# Patient Record
Sex: MALE | Race: WHITE | Employment: OTHER | URBAN - NONMETROPOLITAN AREA
[De-identification: names, ages, dates, MRNs, and addresses within clinical notes are randomized per-mention and may not be internally consistent; named-entity substitution may affect disease eponyms.]

---

## 2017-06-27 ENCOUNTER — OFFICE VISIT (OUTPATIENT)
Dept: PRIMARY CARE CLINIC | Age: 67
End: 2017-06-27
Payer: MEDICARE

## 2017-06-27 VITALS
BODY MASS INDEX: 30.88 KG/M2 | SYSTOLIC BLOOD PRESSURE: 136 MMHG | HEIGHT: 72 IN | WEIGHT: 228 LBS | TEMPERATURE: 98.6 F | OXYGEN SATURATION: 93 % | DIASTOLIC BLOOD PRESSURE: 80 MMHG | HEART RATE: 70 BPM

## 2017-06-27 DIAGNOSIS — J02.9 PHARYNGITIS, UNSPECIFIED ETIOLOGY: Primary | ICD-10-CM

## 2017-06-27 LAB — S PYO AG THROAT QL: POSITIVE

## 2017-06-27 PROCEDURE — 1123F ACP DISCUSS/DSCN MKR DOCD: CPT | Performed by: PEDIATRICS

## 2017-06-27 PROCEDURE — 1036F TOBACCO NON-USER: CPT | Performed by: PEDIATRICS

## 2017-06-27 PROCEDURE — 99203 OFFICE O/P NEW LOW 30 MIN: CPT | Performed by: PEDIATRICS

## 2017-06-27 PROCEDURE — 3017F COLORECTAL CA SCREEN DOC REV: CPT | Performed by: PEDIATRICS

## 2017-06-27 PROCEDURE — 87880 STREP A ASSAY W/OPTIC: CPT | Performed by: PEDIATRICS

## 2017-06-27 PROCEDURE — 4040F PNEUMOC VAC/ADMIN/RCVD: CPT | Performed by: PEDIATRICS

## 2017-06-27 PROCEDURE — G8427 DOCREV CUR MEDS BY ELIG CLIN: HCPCS | Performed by: PEDIATRICS

## 2017-06-27 PROCEDURE — G8417 CALC BMI ABV UP PARAM F/U: HCPCS | Performed by: PEDIATRICS

## 2017-06-27 RX ORDER — ROPINIROLE 0.5 MG/1
0.5 TABLET, FILM COATED ORAL 2 TIMES DAILY
COMMUNITY

## 2017-06-27 RX ORDER — AMOXICILLIN 500 MG/1
500 CAPSULE ORAL 2 TIMES DAILY
Qty: 20 CAPSULE | Refills: 0 | Status: SHIPPED | OUTPATIENT
Start: 2017-06-27 | End: 2017-07-07

## 2017-06-27 RX ORDER — ESCITALOPRAM OXALATE 20 MG/1
20 TABLET ORAL DAILY
COMMUNITY

## 2017-06-27 RX ORDER — IRBESARTAN AND HYDROCHLOROTHIAZIDE 150; 12.5 MG/1; MG/1
1 TABLET, FILM COATED ORAL DAILY
COMMUNITY

## 2017-06-27 RX ORDER — ROSUVASTATIN CALCIUM 10 MG/1
10 TABLET, COATED ORAL DAILY
COMMUNITY

## 2017-06-27 RX ORDER — PANTOPRAZOLE SODIUM 40 MG/1
40 TABLET, DELAYED RELEASE ORAL DAILY
COMMUNITY

## 2017-06-27 ASSESSMENT — ENCOUNTER SYMPTOMS
COUGH: 0
CHOKING: 0
SINUS PRESSURE: 0
VOICE CHANGE: 0
WHEEZING: 0
TROUBLE SWALLOWING: 0
CHEST TIGHTNESS: 0
NAUSEA: 0
SORE THROAT: 1
SHORTNESS OF BREATH: 0
VOMITING: 0
CONSTIPATION: 0
ABDOMINAL PAIN: 0
BACK PAIN: 0
DIARRHEA: 0
ABDOMINAL DISTENTION: 0

## 2018-01-03 NOTE — PATIENT DISCUSSION
Patient was allergic to maxitrol. Stop current drops. Tobradex QID for 1 week then BID for 1 week OU.

## 2018-01-24 ENCOUNTER — IMPORTED ENCOUNTER (OUTPATIENT)
Dept: URBAN - METROPOLITAN AREA CLINIC 43 | Facility: CLINIC | Age: 68
End: 2018-01-24

## 2018-01-24 PROBLEM — Z96.1: Noted: 2018-01-24

## 2018-01-24 PROBLEM — E11.3293: Noted: 2018-01-24

## 2018-01-24 PROBLEM — H26.493: Noted: 2018-01-24

## 2018-02-20 ENCOUNTER — IMPORTED ENCOUNTER (OUTPATIENT)
Dept: URBAN - METROPOLITAN AREA CLINIC 43 | Facility: CLINIC | Age: 68
End: 2018-02-20

## 2018-02-20 PROBLEM — H26.491: Noted: 2018-02-20

## 2018-02-20 PROBLEM — H26.492: Noted: 2018-02-20

## 2018-03-13 ENCOUNTER — IMPORTED ENCOUNTER (OUTPATIENT)
Dept: URBAN - METROPOLITAN AREA CLINIC 43 | Facility: CLINIC | Age: 68
End: 2018-03-13

## 2018-03-13 PROBLEM — H26.491: Noted: 2018-03-13

## 2018-03-19 NOTE — PATIENT DISCUSSION
Patient states that the floaters are still bad and bothersome at times. recommended vitrectomy ou if and when they affect AVL.

## 2018-03-23 NOTE — PATIENT DISCUSSION
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

## 2019-03-25 ENCOUNTER — IMPORTED ENCOUNTER (OUTPATIENT)
Dept: URBAN - METROPOLITAN AREA CLINIC 43 | Facility: CLINIC | Age: 69
End: 2019-03-25

## 2019-03-25 ENCOUNTER — PREPPED CHART (OUTPATIENT)
Dept: URBAN - METROPOLITAN AREA CLINIC 32 | Facility: CLINIC | Age: 69
End: 2019-03-25

## 2019-03-25 PROBLEM — H43.813: Noted: 2019-03-25

## 2019-03-25 PROBLEM — Z96.1: Noted: 2019-03-25

## 2019-03-25 PROBLEM — E11.9: Noted: 2019-03-25

## 2019-03-25 PROBLEM — H16.223: Noted: 2019-03-25

## 2020-02-04 ENCOUNTER — ESTABLISHED COMPREHENSIVE EXAM (OUTPATIENT)
Dept: URBAN - METROPOLITAN AREA CLINIC 32 | Facility: CLINIC | Age: 70
End: 2020-02-04

## 2020-02-04 DIAGNOSIS — H16.223: ICD-10-CM

## 2020-02-04 PROCEDURE — 92015 DETERMINE REFRACTIVE STATE: CPT

## 2020-02-04 PROCEDURE — 92014 COMPRE OPH EXAM EST PT 1/>: CPT

## 2020-02-04 ASSESSMENT — VISUAL ACUITY
OS_CC: J3
OS_SC: 20/25+1
OD_SC: 20/50+1
OD_PH: 20/20-1
OD_CC: J2

## 2020-02-04 ASSESSMENT — TONOMETRY
OS_IOP_MMHG: 12
OD_IOP_MMHG: 14

## 2020-04-19 ASSESSMENT — TONOMETRY
OD_IOP_MMHG: 13.0
OD_IOP_MMHG: 16.0
OS_IOP_MMHG: 16.0
OS_IOP_MMHG: 14.0
OS_IOP_MMHG: 15.0
OD_IOP_MMHG: 15.0
OS_IOP_MMHG: 15.0
OD_IOP_MMHG: 14.0

## 2020-04-19 ASSESSMENT — KERATOMETRY
OS_K1POWER_DIOPTERS: 46
OD_K2POWER_DIOPTERS: 44.25
OD_AXISANGLE2_DEGREES: 175
OD_K1POWER_DIOPTERS: 44.75
OD_K1POWER_DIOPTERS: 45.5
OS_AXISANGLE_DEGREES: 10
OD_AXISANGLE_DEGREES: 85
OD_AXISANGLE2_DEGREES: 5
OD_AXISANGLE_DEGREES: 95
OD_K1POWER_DIOPTERS: 45.25
OS_K1POWER_DIOPTERS: 45.5
OD_AXISANGLE_DEGREES: 95
OD_K2POWER_DIOPTERS: 45
OS_AXISANGLE2_DEGREES: 100
OS_AXISANGLE2_DEGREES: 100
OS_K2POWER_DIOPTERS: 45
OS_K1POWER_DIOPTERS: 46
OS_K2POWER_DIOPTERS: 45
OS_AXISANGLE_DEGREES: 60
OS_AXISANGLE_DEGREES: 10
OD_K2POWER_DIOPTERS: 44
OD_AXISANGLE2_DEGREES: 5
OS_AXISANGLE2_DEGREES: 150
OS_K2POWER_DIOPTERS: 45

## 2020-04-19 ASSESSMENT — VISUAL ACUITY
OS_OTHER: <20/400.
OD_CC: J1+ @15"
OD_SC: 20/20-2
OS_SC: 20/40
OS_CC: J10
OS_CC: J1+
OD_SC: 20/40-
OS_SC: 20/20 -2
OS_CC: J1+ @15"
OD_OTHER: 20/400.
OD_PH: 20/25
OD_CC: J1+
OS_SC: 20/400
OD_SC: 20/40
OD_SC: 20/25 -2
OS_SC: 20/20-
OD_OTHER: 20/400.
OD_SC: 20/20
OS_SC: 20/25
OS_PH: 20/30+
OS_SC: 20/20 -2
OD_CC: J1+
OD_SC: 20/20-

## 2020-06-12 ENCOUNTER — OFFICE VISIT (OUTPATIENT)
Dept: URBAN - METROPOLITAN AREA CLINIC 68 | Facility: CLINIC | Age: 70
End: 2020-06-12

## 2020-06-12 ENCOUNTER — TELEPHONE ENCOUNTER (OUTPATIENT)
Dept: URBAN - METROPOLITAN AREA CLINIC 68 | Facility: CLINIC | Age: 70
End: 2020-06-12

## 2020-07-13 ENCOUNTER — LAB OUTSIDE AN ENCOUNTER (OUTPATIENT)
Dept: URBAN - METROPOLITAN AREA CLINIC 68 | Facility: CLINIC | Age: 70
End: 2020-07-13

## 2020-07-24 ENCOUNTER — TELEPHONE ENCOUNTER (OUTPATIENT)
Dept: URBAN - METROPOLITAN AREA CLINIC 68 | Facility: CLINIC | Age: 70
End: 2020-07-24

## 2022-06-04 ENCOUNTER — TELEPHONE ENCOUNTER (OUTPATIENT)
Dept: URBAN - METROPOLITAN AREA CLINIC 68 | Facility: CLINIC | Age: 72
End: 2022-06-04

## 2022-06-04 RX ORDER — RIFAXIMIN 200 MG/1
TABLET ORAL
Qty: 42 | Refills: 0 | OUTPATIENT
Start: 2020-02-24 | End: 2020-03-09

## 2022-06-04 RX ORDER — LOSARTAN POTASSIUM AND HYDROCHLOROTHIAZIDE 100; 25 MG/1; MG/1
LOSARTAN POTASSIUM-HCTZ( 100-25MG ORAL  DAILY ) INACTIVE -HX ENTRY TABLET, FILM COATED ORAL DAILY
OUTPATIENT
Start: 2020-02-24

## 2022-06-04 RX ORDER — SUCRALFATE 1 G/1
TABLET ORAL
Qty: 60 | Refills: 0 | OUTPATIENT
Start: 2020-05-28 | End: 2020-06-27

## 2022-06-05 ENCOUNTER — TELEPHONE ENCOUNTER (OUTPATIENT)
Dept: URBAN - METROPOLITAN AREA CLINIC 68 | Facility: CLINIC | Age: 72
End: 2022-06-05

## 2022-06-05 RX ORDER — LOSARTAN POTASSIUM 25 MG/1
LOSARTAN POTASSIUM( 25MG ORAL  DAILY ) ACTIVE -HX ENTRY TABLET ORAL DAILY
Status: ACTIVE | COMMUNITY
Start: 2020-06-12

## 2022-06-05 RX ORDER — DULAGLUTIDE 0.75 MG/.5ML
TRULICITY( 0.75MG/0.5ML SUBCUTANEOUS   ) ACTIVE -HX ENTRY INJECTION, SOLUTION SUBCUTANEOUS
Status: ACTIVE | COMMUNITY
Start: 2020-06-12

## 2022-06-05 RX ORDER — INSULIN DEGLUDEC INJECTION 100 U/ML
TRESIBA( 100UNIT/ML SUBCUTANEOUS   ) ACTIVE -HX ENTRY INJECTION, SOLUTION SUBCUTANEOUS
Status: ACTIVE | COMMUNITY
Start: 2020-06-12

## 2022-06-05 RX ORDER — PANTOPRAZOLE SODIUM 40 MG/1
PANTOPRAZOLE SODIUM( 40MG ORAL  DAILY ) ACTIVE -HX ENTRY TABLET, DELAYED RELEASE ORAL DAILY
Status: ACTIVE | COMMUNITY
Start: 2020-06-12

## 2022-06-05 RX ORDER — ROPINIROLE 0.5 MG/1
ROPINIROLE HCL( 0.5MG ORAL  TWO TIMES DAILY ) ACTIVE -HX ENTRY TABLET, FILM COATED ORAL
Status: ACTIVE | COMMUNITY
Start: 2020-06-12

## 2022-06-05 RX ORDER — ESCITALOPRAM 20 MG/1
ESCITALOPRAM OXALATE( 20MG ORAL  DAILY ) ACTIVE -HX ENTRY TABLET, FILM COATED ORAL DAILY
Status: ACTIVE | COMMUNITY
Start: 2020-06-12

## 2022-06-05 RX ORDER — LORATADINE 10 MG/1
LORATADINE( 10MG ORAL  DAILY ) ACTIVE -HX ENTRY CAPSULE, LIQUID FILLED ORAL DAILY
Status: ACTIVE | COMMUNITY
Start: 2020-06-12

## 2022-06-05 RX ORDER — HYDROCHLOROTHIAZIDE 12.5 MG/1
HYDROCHLOROTHIAZIDE( 12.5MG ORAL  DAILY ) ACTIVE -HX ENTRY CAPSULE ORAL DAILY
Status: ACTIVE | COMMUNITY
Start: 2020-06-12

## 2022-06-05 RX ORDER — INSULIN LISPRO 100 [IU]/ML
HUMALOG( 100UNIT/ML SUBCUTANEOUS   ) ACTIVE -HX ENTRY INJECTION, SOLUTION INTRAVENOUS; SUBCUTANEOUS
Status: ACTIVE | COMMUNITY
Start: 2020-06-12

## 2022-06-05 RX ORDER — ROSUVASTATIN CALCIUM 20 MG/1
ROSUVASTATIN CALCIUM( 20MG ORAL  DAILY ) ACTIVE -HX ENTRY TABLET, FILM COATED ORAL DAILY
Status: ACTIVE | COMMUNITY
Start: 2020-06-12

## 2022-06-25 ENCOUNTER — TELEPHONE ENCOUNTER (OUTPATIENT)
Age: 72
End: 2022-06-25

## 2022-06-25 RX ORDER — SUCRALFATE 1 G/1
TABLET ORAL
Qty: 60 | Refills: 0 | OUTPATIENT
Start: 2020-05-28 | End: 2020-06-27

## 2022-06-25 RX ORDER — RIFAXIMIN 200 MG/1
TABLET ORAL
Qty: 42 | Refills: 0 | OUTPATIENT
Start: 2020-02-24 | End: 2020-03-09

## 2022-06-25 RX ORDER — LOSARTAN POTASSIUM AND HYDROCHLOROTHIAZIDE 25; 100 MG/1; MG/1
LOSARTAN POTASSIUM-HCTZ( 100-25MG ORAL  DAILY ) INACTIVE -HX ENTRY TABLET, FILM COATED ORAL DAILY
OUTPATIENT
Start: 2020-02-24

## 2022-06-26 ENCOUNTER — TELEPHONE ENCOUNTER (OUTPATIENT)
Age: 72
End: 2022-06-26

## 2022-06-26 RX ORDER — ROSUVASTATIN CALCIUM 20 MG/1
ROSUVASTATIN CALCIUM( 20MG ORAL  DAILY ) ACTIVE -HX ENTRY TABLET, FILM COATED ORAL DAILY
Status: ACTIVE | COMMUNITY
Start: 2020-06-12

## 2022-06-26 RX ORDER — LORATADINE 10 MG/1
LORATADINE( 10MG ORAL  DAILY ) ACTIVE -HX ENTRY CAPSULE, LIQUID FILLED ORAL DAILY
Status: ACTIVE | COMMUNITY
Start: 2020-06-12

## 2022-06-26 RX ORDER — INSULIN LISPRO 100 [IU]/ML
HUMALOG( 100UNIT/ML SUBCUTANEOUS   ) ACTIVE -HX ENTRY INJECTION, SOLUTION INTRAVENOUS; SUBCUTANEOUS
Status: ACTIVE | COMMUNITY
Start: 2020-06-12

## 2022-06-26 RX ORDER — HYDROCHLOROTHIAZIDE 12.5 MG/1
HYDROCHLOROTHIAZIDE( 12.5MG ORAL  DAILY ) ACTIVE -HX ENTRY CAPSULE ORAL DAILY
Status: ACTIVE | COMMUNITY
Start: 2020-06-12

## 2022-06-26 RX ORDER — ROPINIROLE 0.5 MG/1
ROPINIROLE HCL( 0.5MG ORAL  TWO TIMES DAILY ) ACTIVE -HX ENTRY TABLET, FILM COATED ORAL
Status: ACTIVE | COMMUNITY
Start: 2020-06-12

## 2022-06-26 RX ORDER — INSULIN DEGLUDEC INJECTION 100 U/ML
TRESIBA( 100UNIT/ML SUBCUTANEOUS   ) ACTIVE -HX ENTRY INJECTION, SOLUTION SUBCUTANEOUS
Status: ACTIVE | COMMUNITY
Start: 2020-06-12

## 2022-06-26 RX ORDER — ESCITALOPRAM 20 MG/1
ESCITALOPRAM OXALATE( 20MG ORAL  DAILY ) ACTIVE -HX ENTRY TABLET, FILM COATED ORAL DAILY
Status: ACTIVE | COMMUNITY
Start: 2020-06-12

## 2022-06-26 RX ORDER — TAMSULOSIN HYDROCHLORIDE 0.4 MG/1
TAMSULOSIN HCL( 0.4MG ORAL  DAILY ) ACTIVE -HX ENTRY CAPSULE ORAL DAILY
Status: ACTIVE | COMMUNITY
Start: 2020-06-12

## 2022-06-26 RX ORDER — ASPIRIN 81 MG/1
LOW-DOSE ASPIRIN( 81MG ORAL  DAILY ) ACTIVE -HX ENTRY TABLET, COATED ORAL DAILY
Status: ACTIVE | COMMUNITY
Start: 2020-06-12

## 2022-06-26 RX ORDER — PANTOPRAZOLE 40 MG/1
PANTOPRAZOLE SODIUM( 40MG ORAL  DAILY ) ACTIVE -HX ENTRY TABLET, DELAYED RELEASE ORAL DAILY
Status: ACTIVE | COMMUNITY
Start: 2020-06-12

## 2022-06-26 RX ORDER — DULAGLUTIDE 0.75 MG/.5ML
TRULICITY( 0.75MG/0.5ML SUBCUTANEOUS   ) ACTIVE -HX ENTRY INJECTION, SOLUTION SUBCUTANEOUS
Status: ACTIVE | COMMUNITY
Start: 2020-06-12

## 2022-06-26 RX ORDER — LOSARTAN POTASSIUM 25 MG/1
LOSARTAN POTASSIUM( 25MG ORAL  DAILY ) ACTIVE -HX ENTRY TABLET, FILM COATED ORAL DAILY
Status: ACTIVE | COMMUNITY
Start: 2020-06-12

## 2022-07-09 ENCOUNTER — TELEPHONE ENCOUNTER (OUTPATIENT)
Dept: URBAN - METROPOLITAN AREA CLINIC 121 | Facility: CLINIC | Age: 72
End: 2022-07-09

## 2022-07-10 ENCOUNTER — TELEPHONE ENCOUNTER (OUTPATIENT)
Dept: URBAN - METROPOLITAN AREA CLINIC 121 | Facility: CLINIC | Age: 72
End: 2022-07-10